# Patient Record
Sex: FEMALE | Race: WHITE
[De-identification: names, ages, dates, MRNs, and addresses within clinical notes are randomized per-mention and may not be internally consistent; named-entity substitution may affect disease eponyms.]

---

## 2019-08-22 ENCOUNTER — HOSPITAL ENCOUNTER (EMERGENCY)
Dept: HOSPITAL 62 - ER | Age: 34
Discharge: HOME | End: 2019-08-22
Payer: COMMERCIAL

## 2019-08-22 VITALS — DIASTOLIC BLOOD PRESSURE: 76 MMHG | SYSTOLIC BLOOD PRESSURE: 123 MMHG

## 2019-08-22 DIAGNOSIS — Z88.0: ICD-10-CM

## 2019-08-22 DIAGNOSIS — H53.8: ICD-10-CM

## 2019-08-22 DIAGNOSIS — G43.909: Primary | ICD-10-CM

## 2019-08-22 DIAGNOSIS — Z91.040: ICD-10-CM

## 2019-08-22 DIAGNOSIS — R11.0: ICD-10-CM

## 2019-08-22 NOTE — ER DOCUMENT REPORT
Entered by JORDANA HORAN SCRIBE  08/22/19 1205 





Acting as scribe for:SWATHI MCDERMOTT MD





ED General





- General


Chief Complaint: Headache >24 hrs old


Stated Complaint: HEADACHE


Time Seen by Provider: 08/22/19 11:07


Primary Care Provider: 


LUIS GREY PA [Primary Care Provider] - Follow up as needed


Mode of Arrival: Ambulatory


Notes: 





Patient is a 34-year-old female with a history of migraine headaches that 

presents to the emergency department today complaining of a migraine headache 

for the last x3 days.   Patient also complains of nausea, photophobia, and some 

blurred vision.  Patient states that yesterday she saw her PCP and was given 

Toradol with minimal relief.  Patient states that she takes sumatriptan and her 

last dose was at 0300 this morning.  Patient denies any recent trauma, vomiting,

fevers, not on anticoagulation medications.


TRAVEL OUTSIDE OF THE [place] IN LAST 30 DAYS: No





- Related Data


Allergies/Adverse Reactions: 


                                        





latex Allergy (Verified 08/22/19 10:52)


   


Penicillins Allergy (Verified 08/22/19 10:52)


   











Past Medical History





- General


Information source: Patient





- Social History


Smoking Status: Never Smoker


Cigarette use (# per day): No


Chew tobacco use (# tins/day): No


Frequency of alcohol use: Occasional


Drug Abuse: None


Family History: Reviewed & Not Pertinent


Patient has suicidal ideation: No


Patient has homicidal ideation: No


Neurological Medical History: Reports: Hx Migraine





Review of Systems





- Review of Systems


Constitutional: No symptoms reported.  denies: Fever


EENT: See HPI, Blurred vision, Other - photophobia


Cardiovascular: No symptoms reported


Respiratory: No symptoms reported


Gastrointestinal: See HPI, Nausea


Genitourinary: No symptoms reported


Female Genitourinary: No symptoms reported


Musculoskeletal: No symptoms reported


Skin: No symptoms reported


Hematologic/Lymphatic: No symptoms reported


Neurological/Psychological: No symptoms reported


-: Yes All other systems reviewed and negative





Physical Exam





- Vital signs


Vitals: 


                                        











Temp Pulse Resp BP Pulse Ox


 


 98.0 F   112 H  15   137/95 H  98 


 


 08/22/19 10:55  08/22/19 10:55  08/22/19 10:55  08/22/19 10:55  08/22/19 10:55














- Notes


Notes: 





Physical Exam:


 


General: Alert, appears well. Wearing sunglasses.


 


HEENT: Normocephalic. Atraumatic. PERRL. Extraocular movements intact. 

Oropharynx clear.


 


Neck: Supple. Non-tender.


 


Respiratory: No respiratory distress. Clear and equal breath sounds bilaterally.


 


Cardiovascular: Regular rate and rhythm. 


 


Abdominal: Normal Inspection. Non-tender. No distension. Normal Bowel Sounds. 


 


Back: Non-tender. No deformity or step off.


 


Extremities: Moves all four extremities.


Upper extremities: Normal inspection. Normal ROM.  


Lower extremities: Normal inspection. No edema. Normal ROM.


 


Neurological: Photophobic. Grossly neurologically intact. AAOx4. Normal speech. 




 


Psychological: Normal affect. Normal Mood. 


 


Skin: Warm. Dry. Normal color.





Course





- Re-evaluation


Re-evalutation: 





08/22/19 12:55


Patient symptoms slightly improved when reevaluated.  Patient resting 

comfortably with eyes closed.  States that her headache is slightly decreased.  

Will add Reglan and Decadron to migraine cocktail at this time as well as 

Fioricet and reevaluate


08/22/19 13:33


She is symptoms of improved she is requesting to go home.  She was instructed to

follow-up with her primary care physician regarding titration of her current 

medications.  Return precautions provided.





- Vital Signs


Vital signs: 


                                        











Temp Pulse Resp BP Pulse Ox


 


 98.0 F   112 H  15   137/95 H  98 


 


 08/22/19 10:55  08/22/19 10:55  08/22/19 10:55  08/22/19 10:55  08/22/19 10:55














Discharge





- Discharge


Clinical Impression: 


Migraine


Qualifiers:


 Migraine type: unspecified Status migrainosus presence: without status 

migrainosus Intractability: not intractable Qualified Code(s): G43.909 - 

Migraine, unspecified, not intractable, without status migrainosus





Condition: Good


Disposition: HOME, SELF-CARE


Instructions:  Migraine Headache ([facility])


Referrals: 


LUIS GREY PA [Primary Care Provider] - Follow up as needed





I personally performed the services described in the documentation, reviewed and

edited the documentation which was dictated to the scribe in my presence, and it

accurately records my words and actions.

## 2019-08-22 NOTE — ER DOCUMENT REPORT
ED Medical Screen (RME)





- General


Chief Complaint: Headache >24 hrs old


Stated Complaint: HEADACHE


Time Seen by Provider: 08/22/19 11:07


Mode of Arrival: Ambulatory


Information source: Patient


Notes: 





34-year-old female presented to ED for complaint of migraine headaches for a few

days but today is much worse.  She states she went to the primary care and they 

gave her some Toradol yesterday but she could not see them today.  She does have

a history of migraines and anxiety.  She also has an appendectomy and tonsils 

and adenoids.  She does drink weekly does not smoke or do any drugs.  She is 

alert oriented respirations regular and unlabored speaking in full sentences 

walks with even steady gait.  I have ordered her some Compazine Toradol and 

Benadryl IV with some IV fluids.











I have greeted and performed a rapid initial assessment of this patient.  A 

comprehensive ED assessment and evaluation of the patient, analysis of test 

results and completion of medical decision making process will be conducted by 

an additional ED providers.











Dictation of this chart was performed using voice recognition software; 

therefore, there may be some unintended grammatical errors.


TRAVEL OUTSIDE OF THE [place] IN LAST 30 DAYS: No





- Related Data


Allergies/Adverse Reactions: 


                                        





latex Allergy (Verified 08/22/19 10:52)


   


Penicillins Allergy (Verified 08/22/19 10:52)


   











Past Medical History


Renal/ Medical History: Denies: Hx Peritoneal Dialysis





Physical Exam





- Vital signs


Vitals: 





                                        











Temp Pulse Resp BP Pulse Ox


 


 98.0 F   112 H  15   137/95 H  98 


 


 08/22/19 10:55  08/22/19 10:55  08/22/19 10:55  08/22/19 10:55  08/22/19 10:55














Course





- Vital Signs


Vital signs: 





                                        











Temp Pulse Resp BP Pulse Ox


 


 98.0 F   112 H  15   137/95 H  98 


 


 08/22/19 10:55  08/22/19 10:55  08/22/19 10:55  08/22/19 10:55  08/22/19 10:55